# Patient Record
Sex: FEMALE | Race: WHITE | Employment: OTHER | URBAN - METROPOLITAN AREA
[De-identification: names, ages, dates, MRNs, and addresses within clinical notes are randomized per-mention and may not be internally consistent; named-entity substitution may affect disease eponyms.]

---

## 2018-04-23 ENCOUNTER — OFFICE VISIT (OUTPATIENT)
Dept: CARDIOLOGY CLINIC | Age: 83
End: 2018-04-23
Payer: MEDICARE

## 2018-04-23 VITALS
BODY MASS INDEX: 19.83 KG/M2 | HEIGHT: 60 IN | DIASTOLIC BLOOD PRESSURE: 76 MMHG | RESPIRATION RATE: 14 BRPM | WEIGHT: 101 LBS | HEART RATE: 58 BPM | SYSTOLIC BLOOD PRESSURE: 130 MMHG

## 2018-04-23 DIAGNOSIS — I35.1 AORTIC VALVE INSUFFICIENCY, ETIOLOGY OF CARDIAC VALVE DISEASE UNSPECIFIED: Primary | ICD-10-CM

## 2018-04-23 PROCEDURE — G8420 CALC BMI NORM PARAMETERS: HCPCS | Performed by: INTERNAL MEDICINE

## 2018-04-23 PROCEDURE — 1036F TOBACCO NON-USER: CPT | Performed by: INTERNAL MEDICINE

## 2018-04-23 PROCEDURE — 1090F PRES/ABSN URINE INCON ASSESS: CPT | Performed by: INTERNAL MEDICINE

## 2018-04-23 PROCEDURE — G8427 DOCREV CUR MEDS BY ELIG CLIN: HCPCS | Performed by: INTERNAL MEDICINE

## 2018-04-23 PROCEDURE — 99214 OFFICE O/P EST MOD 30 MIN: CPT | Performed by: INTERNAL MEDICINE

## 2018-04-23 PROCEDURE — 1123F ACP DISCUSS/DSCN MKR DOCD: CPT | Performed by: INTERNAL MEDICINE

## 2018-04-23 PROCEDURE — 93000 ELECTROCARDIOGRAM COMPLETE: CPT | Performed by: INTERNAL MEDICINE

## 2018-04-23 PROCEDURE — 4040F PNEUMOC VAC/ADMIN/RCVD: CPT | Performed by: INTERNAL MEDICINE

## 2018-09-18 NOTE — PROGRESS NOTES
daily      polyethyl glycol-propyl glycol 0.4-0.3 % (SYSTANE) 0.4-0.3 % ophthalmic solution Place 1 drop into both eyes as needed for Dry Eyes (Glaucoma)      Coenzyme Q10 (CO Q 10) 100 MG CAPS Take 100 mg by mouth daily.  aspirin 81 MG tablet Take 81 mg by mouth every other day       Biotin 1000 MCG TABS Take 1,000 mcg by mouth daily.  Vitamin D (CHOLECALCIFEROL) 1000 UNITS CAPS capsule Take 1,000 Units by mouth daily. No current facility-administered medications for this visit. Allergies   Allergen Reactions    Septra [Sulfamethoxazole-Trimethoprim]      Review of Systems:   Constitutional: Negative for fever, activity change and appetite change. Respiratory: Negative for cough, chest tightness, shortness of breath and wheezing. Cardiovascular: negative except as outlined in the HPI    PHYSICAL EXAM:  Vitals:    18 0812   BP: 132/84   Pulse: 56   Resp: 18   Weight: 98 lb 12.8 oz (44.8 kg)   Height: 4' 8\" (1.422 m)   Constitutional: Oriented to person, place, and time. Well-developed and cooperative. Appears younger than stated age  Head: Normocephalic and atraumatic. Cardiovascular:  Normal S1/ S2, Feet appear to be well perfused. Regular rhythm present. PMI is not displaced. Pulmonary/Chest: Effort normal and breath sounds normal. No respiratory distress. Musculoskeletal: Normal range of motion of all extremities, no muscle weakness. Neurological: Alert and oriented to person, place, and time. Gait normal.   Skin: Skin is warm and dry. No bruising, no ecchymosis and no rash noted. Extremity: No clubbing or cyanosis. No edema. Psychiatric: Normal mood and affect. Thought content normal.     EK18: sinus bradycardia,  Rate 56, , QRS 88, QTc 431    Assessment:     1. Paroxysmal Atrial Tachycardia  - stable, no recurrences within the last year on beta blocker  - continue BB      2.  Aortic and mitral Regurgitation  - Mild-moderate in severity in ,

## 2018-09-19 ENCOUNTER — OFFICE VISIT (OUTPATIENT)
Dept: NON INVASIVE DIAGNOSTICS | Age: 83
End: 2018-09-19
Payer: MEDICARE

## 2018-09-19 VITALS
HEART RATE: 56 BPM | RESPIRATION RATE: 18 BRPM | DIASTOLIC BLOOD PRESSURE: 84 MMHG | HEIGHT: 56 IN | WEIGHT: 98.8 LBS | SYSTOLIC BLOOD PRESSURE: 132 MMHG | BODY MASS INDEX: 22.22 KG/M2

## 2018-09-19 DIAGNOSIS — I47.1 PAT (PAROXYSMAL ATRIAL TACHYCARDIA) (HCC): Primary | ICD-10-CM

## 2018-09-19 DIAGNOSIS — I10 ESSENTIAL HYPERTENSION: ICD-10-CM

## 2018-09-19 DIAGNOSIS — I35.1 NONRHEUMATIC AORTIC VALVE INSUFFICIENCY: ICD-10-CM

## 2018-09-19 PROCEDURE — G8427 DOCREV CUR MEDS BY ELIG CLIN: HCPCS | Performed by: INTERNAL MEDICINE

## 2018-09-19 PROCEDURE — 1101F PT FALLS ASSESS-DOCD LE1/YR: CPT | Performed by: INTERNAL MEDICINE

## 2018-09-19 PROCEDURE — 4040F PNEUMOC VAC/ADMIN/RCVD: CPT | Performed by: INTERNAL MEDICINE

## 2018-09-19 PROCEDURE — G8420 CALC BMI NORM PARAMETERS: HCPCS | Performed by: INTERNAL MEDICINE

## 2018-09-19 PROCEDURE — 1036F TOBACCO NON-USER: CPT | Performed by: INTERNAL MEDICINE

## 2018-09-19 PROCEDURE — 93000 ELECTROCARDIOGRAM COMPLETE: CPT | Performed by: INTERNAL MEDICINE

## 2018-09-19 PROCEDURE — 99212 OFFICE O/P EST SF 10 MIN: CPT | Performed by: INTERNAL MEDICINE

## 2018-09-19 PROCEDURE — 1090F PRES/ABSN URINE INCON ASSESS: CPT | Performed by: INTERNAL MEDICINE

## 2018-09-19 PROCEDURE — 1123F ACP DISCUSS/DSCN MKR DOCD: CPT | Performed by: INTERNAL MEDICINE

## 2018-09-19 RX ORDER — AMOXICILLIN 500 MG
1 CAPSULE ORAL DAILY
COMMUNITY

## 2019-02-07 RX ORDER — METOPROLOL SUCCINATE 25 MG/1
25 TABLET, EXTENDED RELEASE ORAL 2 TIMES DAILY
Qty: 180 TABLET | Refills: 3 | Status: SHIPPED
Start: 2019-02-07 | End: 2020-02-19 | Stop reason: SDUPTHER

## 2019-04-23 ENCOUNTER — OFFICE VISIT (OUTPATIENT)
Dept: CARDIOLOGY CLINIC | Age: 84
End: 2019-04-23
Payer: MEDICARE

## 2019-04-23 VITALS
HEART RATE: 60 BPM | BODY MASS INDEX: 20.96 KG/M2 | WEIGHT: 104 LBS | DIASTOLIC BLOOD PRESSURE: 90 MMHG | HEIGHT: 59 IN | RESPIRATION RATE: 16 BRPM | SYSTOLIC BLOOD PRESSURE: 138 MMHG

## 2019-04-23 DIAGNOSIS — I47.1 PAT (PAROXYSMAL ATRIAL TACHYCARDIA) (HCC): Primary | ICD-10-CM

## 2019-04-23 PROCEDURE — 4040F PNEUMOC VAC/ADMIN/RCVD: CPT | Performed by: INTERNAL MEDICINE

## 2019-04-23 PROCEDURE — 93000 ELECTROCARDIOGRAM COMPLETE: CPT | Performed by: INTERNAL MEDICINE

## 2019-04-23 PROCEDURE — 1090F PRES/ABSN URINE INCON ASSESS: CPT | Performed by: INTERNAL MEDICINE

## 2019-04-23 PROCEDURE — G8420 CALC BMI NORM PARAMETERS: HCPCS | Performed by: INTERNAL MEDICINE

## 2019-04-23 PROCEDURE — 1036F TOBACCO NON-USER: CPT | Performed by: INTERNAL MEDICINE

## 2019-04-23 PROCEDURE — 1123F ACP DISCUSS/DSCN MKR DOCD: CPT | Performed by: INTERNAL MEDICINE

## 2019-04-23 PROCEDURE — 99213 OFFICE O/P EST LOW 20 MIN: CPT | Performed by: INTERNAL MEDICINE

## 2019-04-23 PROCEDURE — G8427 DOCREV CUR MEDS BY ELIG CLIN: HCPCS | Performed by: INTERNAL MEDICINE

## 2019-04-23 NOTE — PROGRESS NOTES
Subjective:      Patient ID: Hardeep Dee is a 80 y.o. female. HPI:  See subjective below:    Chief Complaint   Patient presents with    Cardiac Valve Problem       Patient Active Problem List    Diagnosis Date Noted    PAT (paroxysmal atrial tachycardia) ([address] 09/19/2018    Nonrheumatic aortic valve insufficiency 09/16/2013     Overview Note:     A. Echo 8/2012 ([facility]):  Mild to moderate AI, normal EF  B. Echo 8/2/13 ([facility]): moderate to severe AI. EF 60%  PERSONAL REVIEW: Moderate AI at most. .  Negative bubble study  C. Echo 3/23/2016: moderate AI. Normal EF      Arrhythmia 09/16/2013     Overview Note:     A. Holter 9/2013 ([facility]?):  Short bursts of PAT      Interatrial septal aneurysm  09/16/2013     Overview Note:     A. Echo 2013 - negative  bubble study          Current Outpatient Medications   Medication Sig Dispense Refill    metoprolol succinate (TOPROL XL) 25 MG extended release tablet Take 1 tablet by mouth 2 times daily 180 tablet 3    memantine (NAMENDA) 5 MG tablet TAKE 1 TABLET TWICE DAILY 180 tablet 0    lisinopril (PRINIVIL;ZESTRIL) 10 MG tablet TAKE 2 TABLETS EVERY MORNING  AND TAKE 1 TABLET EVERY EVENING 270 tablet 0    Multiple Vitamins-Minerals (PRESERVISION AREDS 2+MULTI VIT PO) Take by mouth daily      Omega-3 Fatty Acids (FISH OIL) 1200 MG CAPS Take 1 capsule by mouth daily      Multiple Vitamins-Minerals (CENTRUM SILVER) TABS Take by mouth daily      polyethyl glycol-propyl glycol 0.4-0.3 % (SYSTANE) 0.4-0.3 % ophthalmic solution Place 1 drop into both eyes as needed for Dry Eyes (Glaucoma)      Coenzyme Q10 (CO Q 10) 100 MG CAPS Take 100 mg by mouth daily.  aspirin 81 MG tablet Take 81 mg by mouth every other day       Biotin 1000 MCG TABS Take 1,000 mcg by mouth daily.  Vitamin D (CHOLECALCIFEROL) 1000 UNITS CAPS capsule Take 1,000 Units by mouth daily. No current facility-administered medications for this visit.          Allergies Allergen Reactions    Septra [Sulfamethoxazole-Trimethoprim]        Vitals:    04/23/19 0742   BP: (!) 138/90   Pulse: 60   Resp: 16   Weight: 104 lb (47.2 kg)   Height: 4' 11\" (1.499 m)       Social History     Socioeconomic History    Marital status:       Spouse name: Not on file    Number of children: Not on file    Years of education: Not on file    Highest education level: Not on file   Occupational History    Not on file   Social Needs    Financial resource strain: Not on file    Food insecurity:     Worry: Not on file     Inability: Not on file    Transportation needs:     Medical: Not on file     Non-medical: Not on file   Tobacco Use    Smoking status: Never Smoker    Smokeless tobacco: Never Used   Substance and Sexual Activity    Alcohol use: No    Drug use: No    Sexual activity: Never   Lifestyle    Physical activity:     Days per week: Not on file     Minutes per session: Not on file    Stress: Not on file   Relationships    Social connections:     Talks on phone: Not on file     Gets together: Not on file     Attends Scientologist service: Not on file     Active member of club or organization: Not on file     Attends meetings of clubs or organizations: Not on file     Relationship status: Not on file    Intimate partner violence:     Fear of current or ex partner: Not on file     Emotionally abused: Not on file     Physically abused: Not on file     Forced sexual activity: Not on file   Other Topics Concern    Not on file   Social History Narrative    Not on file       Family History   Problem Relation Age of Onset    Other Mother     Other Father        SUBJECTIVE: Lennox Bartholomew presents to the office today for re-evaluation of chronic cardiac diagnoses She complains of no cardiac symptoms and denies chest pain, dyspnea, exertional chest pressure/discomfort, fatigue, irregular heart beat, lower extremity edema, orthopnea, palpitations, paroxysmal nocturnal dyspnea and syncope. Is  very active with housework, climbing steps and carrying loads. Does have some weight loss. NO drop in functional capacity. Hx corroborated by daughter as she does have cognitive impairment          ROS: negative 10 system review other than stated above    Objective:   Physical Exam   Constitutional: She is oriented to person, place, and time. She appears well-developed. She is cooperative. See vitals above. HENT:   Head: Normocephalic and atraumatic. Normal lips, teeth, gums, oral mucosa wet. Neck: No  JVD present. Cardiovascular: Regular rhythm, S1 normal, S2 normal, intact distal pulses and normal pulses. PMI is not displaced. Exam reveals no gallop. No murmur heard. Carotid bruit is not present   Pulmonary/Chest: Effort normal and breath sounds normal. No respiratory distress. Abdominal: Soft. Normal appearance and bowel sounds are normal. She exhibits no abdominal bruit and no pulsatile midline mass. There is no hepatosplenomegaly. There is no tenderness. Musculoskeletal: Normal range of motion. She exhibits no edema. Gait normal   Neurological: She is alert and oriented to person, place, and time. Gait normal.   Skin: Skin is warm and dry. No bruising, no ecchymosis and no rash noted. Rash is not nodular. She is not diaphoretic. No cyanosis. Psychiatric: She has a normal mood and affect. Her behavior is normal. Thought content normal.     EKG: normal sinus rhythm, non specific ST changes, no change from last tracing     ASSESSMENT & PLAN:    Patient Active Problem List   Diagnosis    Aortic insufficiency:  Moderate AI on echo in 2016 with normal LV. No change in exam or symptoms    Arrhythmia: Holter with PAT in 2013.   No symptoms on beta blocker    Interatrial septal aneurysm : without PFO, clinically irrelevant    Hypertension: on ACE and now beta blocker:       OV one year

## 2020-02-18 NOTE — PROGRESS NOTES
partner: Not on file     Emotionally abused: Not on file     Physically abused: Not on file     Forced sexual activity: Not on file   Other Topics Concern    Not on file   Social History Narrative    Not on file        Patient Active Problem List    Diagnosis Date Noted    PAT (paroxysmal atrial tachycardia) ([facility] [address] 09/19/2018    Nonrheumatic aortic valve insufficiency 09/16/2013     Overview Note:     A. Echo 8/2012 ([facility]):  Mild to moderate AI, normal EF  B. Echo 8/2/13 ([facility]): moderate to severe AI. EF 60%  PERSONAL REVIEW: Moderate AI at most. .  Negative bubble study  C. Echo 3/23/2016: moderate AI. Normal EF      Arrhythmia 09/16/2013     Overview Note:     A. Holter 9/2013 ([facility]?):  Short bursts of PAT      Interatrial septal aneurysm  09/16/2013     Overview Note:     A. Echo 2013 - negative  bubble study          Family History   Problem Relation Age of Onset    Other Mother     Other Father          Past Surgical History:   Procedure Laterality Date    CATARACT REMOVAL WITH IMPLANT Bilateral     EYE SURGERY      TONSILLECTOMY         Current Outpatient Medications   Medication Sig Dispense Refill    metoprolol succinate (TOPROL XL) 25 MG extended release tablet Take 1 tablet by mouth 2 times daily 180 tablet 3    memantine (NAMENDA) 5 MG tablet Take 1 tablet by mouth 2 times daily 180 tablet 1    lisinopril (PRINIVIL;ZESTRIL) 10 MG tablet 2 tab in the morning and 1 tab in the evening 270 tablet 0    Multiple Vitamins-Minerals (PRESERVISION AREDS 2+MULTI VIT PO) Take by mouth daily      Omega-3 Fatty Acids (FISH OIL) 1200 MG CAPS Take 1 capsule by mouth daily      Multiple Vitamins-Minerals (CENTRUM SILVER) TABS Take by mouth daily      polyethyl glycol-propyl glycol 0.4-0.3 % (SYSTANE) 0.4-0.3 % ophthalmic solution Place 1 drop into both eyes as needed for Dry Eyes (Glaucoma)      Coenzyme Q10 (CO Q 10) 100 MG CAPS Take 100 mg by mouth daily.       aspirin 81 MG tablet Take 81 mg by mouth every other day       Biotin 1000 MCG TABS Take 1,000 mcg by mouth daily.  Vitamin D (CHOLECALCIFEROL) 1000 UNITS CAPS capsule Take 1,000 Units by mouth daily.  Handicap Placard MISC by Does not apply route Duration 5 years 1 each 0     No current facility-administered medications for this visit. Allergies   Allergen Reactions    Septra [Sulfamethoxazole-Trimethoprim]            ROS:   Review of Systems   Constitutional: Negative for fatigue and fever. HENT: Negative for congestion, nosebleeds and sinus pressure. Eyes: Negative for redness and visual disturbance. Respiratory: Negative for cough, chest tightness, shortness of breath and wheezing. Cardiovascular: Negative for chest pain, palpitations and leg swelling. Gastrointestinal: Negative for abdominal distention, abdominal pain, diarrhea and nausea. Endocrine: Negative for cold intolerance, heat intolerance, polydipsia and polyphagia. Genitourinary: Negative for difficulty urinating, frequency and urgency. Musculoskeletal: Negative for arthralgias, joint swelling and myalgias. Skin: Negative for color change and wound. Neurological: Negative for dizziness, syncope, weakness and numbness. Psychiatric/Behavioral: Negative for agitation, behavioral problems, confusion, decreased concentration, hallucinations and suicidal ideas. The patient is not nervous/anxious. PHYSICAL EXAM:  Vitals:    02/19/20 1047   BP: 126/78   Pulse: 60   Resp: 18   Weight: 106 lb (48.1 kg)   Height: 4' 8\" (1.422 m)     Physical Exam  Vitals signs reviewed. Constitutional:       Appearance: Normal appearance. HENT:      Head: Normocephalic. Mouth/Throat:      Mouth: Mucous membranes are moist.      Pharynx: Oropharynx is clear. Eyes:      Conjunctiva/sclera: Conjunctivae normal.   Neck:      Musculoskeletal: Normal range of motion and neck supple. Vascular: No carotid bruit.    Cardiovascular: Rate and Rhythm: Normal rate and regular rhythm. Pulses: Normal pulses. Heart sounds: Normal heart sounds. Pulmonary:      Effort: Pulmonary effort is normal.      Breath sounds: Normal breath sounds. No rales. Chest:      Chest wall: No tenderness. Abdominal:      General: Bowel sounds are normal.      Palpations: Abdomen is soft. Musculoskeletal: Normal range of motion. Skin:     General: Skin is warm. Neurological:      General: No focal deficit present. Mental Status: She is alert and oriented to person, place, and time. Psychiatric:         Mood and Affect: Mood normal.         Behavior: Behavior normal.         Thought Content: Thought content normal.            Pertinent Labs:    CBC:   WBC (E9/L)   Date Value   05/19/2015 5.9   05/14/2015 5.7     Hemoglobin (g/dL)   Date Value   05/19/2015 13.7   05/14/2015 14.1     Hematocrit (%)   Date Value   05/19/2015 42.0   05/14/2015 43.9     Platelets (P2/B)   Date Value   05/19/2015 152   05/14/2015 165      BMP:   Sodium (mmol/L)   Date Value   05/19/2015 142   05/14/2015 138     Potassium (mmol/L)   Date Value   05/19/2015 4.1   05/14/2015 4.4     Magnesium (mg/dL)   Date Value   05/14/2015 2.1     Chloride (mmol/L)   Date Value   05/19/2015 104   05/14/2015 100     CO2 (mmol/L)   Date Value   05/19/2015 23   05/14/2015 25     BUN (mg/dL)   Date Value   05/19/2015 12   05/14/2015 18     CREATININE (mg/dL)   Date Value   05/19/2015 0.6   05/14/2015 0.7     Glucose (mg/dL)   Date Value   05/19/2015 103   05/14/2015 100     Calcium (mg/dL)   Date Value   05/19/2015 9.3   05/14/2015 9.7      INR:   INR (no units)   Date Value   05/14/2015 1.2      BNP: No results found for: BNP   TSH:   TSH (uIU/mL)   Date Value   05/14/2015 2.550      Cardiac Injury Profile:    Total CK (U/L)   Date Value   05/14/2015 115     CK-MB (ng/mL)   Date Value   05/14/2015 5.4 (H)     Troponin (ng/mL)   Date Value   05/19/2015 <0.01     Lipid Profile: Triglycerides (mg/dL)   Date Value   06/14/2019 60     HDL (mg/dL)   Date Value   06/14/2019 54     LDL Calculated (mg/dL)   Date Value   10/23/2017 129     Cholesterol, Total (mg/dL)   Date Value   10/23/2017 215      Hemoglobin A1C: No results found for: LABA1C    Pertinent Cardiac Testing:   3/2016 TTE  Normal left ventricular systolic function. Ejection fraction is visually estimated at 60%. Normal right ventricular size and function. There is doppler evidence of stage I diastolic dysfunction. The intra-atrial septum is aneurysmal.   No evidence of intra-atrial shunting on 8/2013 bubble study. Mild mitral regurgitation. Moderate aortic regurgitation. No evidence of flow reversal in the descending aorta. Moderate tricuspid regurgitation. PASP is estimated at 38 mmHg. EKG 4/2019  NSR, QTC 410ms    ECG 2/19/2020: normal EKG, normal sinus rhythm, unchanged from previous tracings    I have independently reviewed all of the ECGs and rhythm strips per above        1. PAT (paroxysmal atrial tachycardia) ([address]    2. Essential hypertension    3. Dyslipidemia         Assessment & Plan  #URIAH  Presents today for follow up of PAT. She remains on Toprol for suppression and denies any recurrent epsiodes. She feels overall well from a EP POV and is happy her current medication regiment. Will follow up in 1 yr. Thank you for allowing me to participate in your patient's care.     Torsten Braun MD  Cardiac Electrophysiology  [address] [place] [address]

## 2020-02-19 ENCOUNTER — OFFICE VISIT (OUTPATIENT)
Dept: NON INVASIVE DIAGNOSTICS | Age: 85
End: 2020-02-19
Payer: MEDICARE

## 2020-02-19 VITALS
DIASTOLIC BLOOD PRESSURE: 78 MMHG | BODY MASS INDEX: 23.84 KG/M2 | SYSTOLIC BLOOD PRESSURE: 126 MMHG | HEART RATE: 60 BPM | WEIGHT: 106 LBS | HEIGHT: 56 IN | RESPIRATION RATE: 18 BRPM

## 2020-02-19 PROCEDURE — 99213 OFFICE O/P EST LOW 20 MIN: CPT | Performed by: INTERNAL MEDICINE

## 2020-02-19 PROCEDURE — 93000 ELECTROCARDIOGRAM COMPLETE: CPT | Performed by: INTERNAL MEDICINE

## 2020-02-19 RX ORDER — METOPROLOL SUCCINATE 25 MG/1
25 TABLET, EXTENDED RELEASE ORAL 2 TIMES DAILY
Qty: 180 TABLET | Refills: 3 | Status: SHIPPED
Start: 2020-02-19 | End: 2021-02-19 | Stop reason: SDUPTHER

## 2020-02-19 ASSESSMENT — ENCOUNTER SYMPTOMS
COUGH: 0
SHORTNESS OF BREATH: 0
NAUSEA: 0
SINUS PRESSURE: 0
EYE REDNESS: 0
ABDOMINAL PAIN: 0
ABDOMINAL DISTENTION: 0
DIARRHEA: 0
WHEEZING: 0
CHEST TIGHTNESS: 0
COLOR CHANGE: 0

## 2021-02-19 DIAGNOSIS — I47.1 PAT (PAROXYSMAL ATRIAL TACHYCARDIA) (HCC): ICD-10-CM

## 2021-02-19 RX ORDER — METOPROLOL SUCCINATE 25 MG/1
25 TABLET, EXTENDED RELEASE ORAL 2 TIMES DAILY
Qty: 180 TABLET | Refills: 3 | Status: SHIPPED | OUTPATIENT
Start: 2021-02-19

## 2021-03-06 ENCOUNTER — IMMUNIZATION (OUTPATIENT)
Dept: PRIMARY CARE CLINIC | Age: 86
End: 2021-03-06
Payer: MEDICARE

## 2021-03-06 PROCEDURE — 91300 COVID-19, PFIZER VACCINE 30MCG/0.3ML DOSE: CPT | Performed by: PHYSICIAN ASSISTANT

## 2021-03-06 PROCEDURE — 0001A COVID-19, PFIZER VACCINE 30MCG/0.3ML DOSE: CPT | Performed by: PHYSICIAN ASSISTANT

## 2021-03-31 ENCOUNTER — IMMUNIZATION (OUTPATIENT)
Dept: PRIMARY CARE CLINIC | Age: 86
End: 2021-03-31
Payer: MEDICARE

## 2021-03-31 PROCEDURE — 0002A COVID-19, PFIZER VACCINE 30MCG/0.3ML DOSE: CPT | Performed by: PHYSICIAN ASSISTANT

## 2021-03-31 PROCEDURE — 91300 COVID-19, PFIZER VACCINE 30MCG/0.3ML DOSE: CPT | Performed by: PHYSICIAN ASSISTANT
